# Patient Record
Sex: MALE | Race: WHITE | NOT HISPANIC OR LATINO | Employment: FULL TIME | URBAN - METROPOLITAN AREA
[De-identification: names, ages, dates, MRNs, and addresses within clinical notes are randomized per-mention and may not be internally consistent; named-entity substitution may affect disease eponyms.]

---

## 2017-03-17 PROBLEM — S22.41XD CLOSED FRACTURE OF MULTIPLE RIBS OF RIGHT SIDE WITH ROUTINE HEALING: Status: ACTIVE | Noted: 2017-03-17

## 2017-03-17 PROBLEM — S27.2XXA HEMOTHORAX WITH PNEUMOTHORAX, TRAUMATIC: Status: ACTIVE | Noted: 2017-03-17

## 2017-03-17 PROBLEM — S42.001A CLOSED DISPLACED FRACTURE OF RIGHT CLAVICLE: Status: ACTIVE | Noted: 2017-03-17

## 2017-03-17 PROBLEM — S42.021A CLOSED DISPLACED FRACTURE OF SHAFT OF RIGHT CLAVICLE: Status: ACTIVE | Noted: 2017-03-17

## 2017-05-11 ENCOUNTER — TELEPHONE (OUTPATIENT)
Dept: VASCULAR SURGERY | Facility: CLINIC | Age: 57
End: 2017-05-11

## 2017-05-11 NOTE — TELEPHONE ENCOUNTER
----- Message from Mary Murphy sent at 5/11/2017 10:43 AM CDT -----  Contact: self  Patient wants to speak with a nurse regarding scheduling an appointment. Please call back at [phone] (home)

## 2017-05-11 NOTE — TELEPHONE ENCOUNTER
Offered appointment for same day but pt declined stated he needed to go to work.  Scheduled for 5/17/17 in [place].  Pt confirmed appointment.

## 2017-05-18 ENCOUNTER — INITIAL CONSULT (OUTPATIENT)
Dept: VASCULAR SURGERY | Facility: CLINIC | Age: 57
End: 2017-05-18
Payer: COMMERCIAL

## 2017-05-18 VITALS — HEIGHT: 69 IN | BODY MASS INDEX: 26.22 KG/M2 | WEIGHT: 177 LBS | RESPIRATION RATE: 16 BRPM

## 2017-05-18 DIAGNOSIS — I71.22 AORTIC ARCH ANEURYSM: Primary | ICD-10-CM

## 2017-05-18 PROCEDURE — 99205 OFFICE O/P NEW HI 60 MIN: CPT | Mod: S$GLB,,, | Performed by: THORACIC SURGERY (CARDIOTHORACIC VASCULAR SURGERY)

## 2017-05-18 PROCEDURE — 99999 PR PBB SHADOW E&M-EST. PATIENT-LVL II: CPT | Mod: PBBFAC,,, | Performed by: THORACIC SURGERY (CARDIOTHORACIC VASCULAR SURGERY)

## 2017-05-18 PROCEDURE — 1160F RVW MEDS BY RX/DR IN RCRD: CPT | Mod: S$GLB,,, | Performed by: THORACIC SURGERY (CARDIOTHORACIC VASCULAR SURGERY)
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
## 2017-05-19 DIAGNOSIS — I71.20 THORACIC AORTIC ANEURYSM WITHOUT RUPTURE: Primary | ICD-10-CM

## 2017-05-22 ENCOUNTER — HOSPITAL ENCOUNTER (OUTPATIENT)
Dept: RADIOLOGY | Facility: HOSPITAL | Age: 57
Discharge: HOME | End: 2017-05-22
Attending: THORACIC SURGERY (CARDIOTHORACIC VASCULAR SURGERY)
Payer: COMMERCIAL

## 2017-05-22 DIAGNOSIS — I71.20 THORACIC AORTIC ANEURYSM WITHOUT RUPTURE: ICD-10-CM

## 2017-05-22 PROCEDURE — 74174 CTA ABD&PLVS W/CONTRAST: CPT | Mod: 26,,, | Performed by: RADIOLOGY

## 2017-05-22 PROCEDURE — 71275 CT ANGIOGRAPHY CHEST: CPT | Mod: 26,,, | Performed by: RADIOLOGY

## 2017-05-22 PROCEDURE — 25500020 PHARM REV CODE 255: Mod: PO | Performed by: THORACIC SURGERY (CARDIOTHORACIC VASCULAR SURGERY)

## 2017-05-22 PROCEDURE — 74174 CTA ABD&PLVS W/CONTRAST: CPT | Mod: TC,PO

## 2017-05-22 RX ADMIN — IOHEXOL 120 ML: 350 INJECTION, SOLUTION INTRAVENOUS at 02:05

## 2017-06-01 DIAGNOSIS — I71.20 THORACIC AORTIC ANEURYSM WITHOUT RUPTURE: Primary | ICD-10-CM

## 2017-06-12 PROBLEM — I71.20 THORACIC AORTIC ANEURYSM WITHOUT RUPTURE: Status: ACTIVE | Noted: 2017-06-12

## 2017-06-14 PROBLEM — G82.20 SPINAL CORD ISCHEMIA CAUSING LOWER EXTREMITY PARAPARESIS: Status: ACTIVE | Noted: 2017-06-14

## 2017-06-14 PROBLEM — G95.11 SPINAL CORD ISCHEMIA CAUSING LOWER EXTREMITY PARAPARESIS: Status: ACTIVE | Noted: 2017-06-14

## 2017-07-03 ENCOUNTER — TELEPHONE (OUTPATIENT)
Dept: VASCULAR SURGERY | Facility: CLINIC | Age: 57
End: 2017-07-03

## 2017-07-03 NOTE — TELEPHONE ENCOUNTER
----- Message from Cami Mcpherson sent at 7/3/2017  8:53 AM CDT -----  Contact: pj godinez  pt has been accepted to rehab, orders needed...[phone]

## 2017-07-19 PROBLEM — A41.9 SEPSIS: Status: ACTIVE | Noted: 2017-07-19

## 2017-07-20 PROBLEM — R50.9 FEVER: Status: ACTIVE | Noted: 2017-07-20

## 2017-07-20 PROBLEM — N30.00 ACUTE CYSTITIS WITHOUT HEMATURIA: Status: ACTIVE | Noted: 2017-07-20

## 2017-07-21 PROBLEM — N39.0 UTI DUE TO KLEBSIELLA SPECIES: Status: ACTIVE | Noted: 2017-07-20

## 2017-07-21 PROBLEM — N31.9 NEUROGENIC BLADDER: Status: ACTIVE | Noted: 2017-07-21

## 2017-07-21 PROBLEM — B96.89 UTI DUE TO KLEBSIELLA SPECIES: Status: ACTIVE | Noted: 2017-07-20

## 2017-07-22 PROBLEM — R06.02 SHORTNESS OF BREATH: Status: ACTIVE | Noted: 2017-07-22

## 2017-07-29 PROBLEM — R50.9 FEVER: Status: RESOLVED | Noted: 2017-07-20 | Resolved: 2017-07-29

## 2017-09-01 PROBLEM — Z86.79 H/O THORACIC AORTIC ANEURYSM REPAIR: Status: ACTIVE | Noted: 2017-09-01

## 2017-09-01 PROBLEM — R49.0 DYSPHONIA: Status: ACTIVE | Noted: 2017-09-01

## 2017-09-01 PROBLEM — Z98.890 H/O THORACIC AORTIC ANEURYSM REPAIR: Status: ACTIVE | Noted: 2017-09-01

## 2017-09-21 PROBLEM — R15.9 FULL INCONTINENCE OF FECES: Status: ACTIVE | Noted: 2017-09-21

## 2017-10-04 PROBLEM — L89.312 DECUBITUS ULCER OF RIGHT BUTTOCK, STAGE 2: Status: ACTIVE | Noted: 2017-10-04

## 2017-10-04 PROBLEM — L89.152 PRESSURE ULCER OF SACRAL REGION, STAGE 2: Status: ACTIVE | Noted: 2017-10-04

## 2017-10-04 PROBLEM — L89.322 DECUBITUS ULCER OF LEFT BUTTOCK, STAGE 2: Status: ACTIVE | Noted: 2017-10-04

## 2017-10-04 PROBLEM — L89.620 PRESSURE ULCER OF LEFT HEEL, UNSTAGEABLE: Status: ACTIVE | Noted: 2017-10-04

## 2017-10-12 PROBLEM — R15.9 INCONTINENCE OF FECES: Status: ACTIVE | Noted: 2017-10-12

## 2017-10-13 PROBLEM — D62 ACUTE BLOOD LOSS ANEMIA: Status: ACTIVE | Noted: 2017-10-13

## 2017-10-23 PROBLEM — J38.01: Status: ACTIVE | Noted: 2017-10-23

## 2017-10-25 PROBLEM — L02.31 ABSCESS OF RIGHT BUTTOCK: Status: ACTIVE | Noted: 2017-10-25

## 2018-01-09 PROBLEM — M54.9 BACK PAIN: Status: ACTIVE | Noted: 2018-01-09

## 2018-01-09 PROBLEM — F32.A DEPRESSION: Status: ACTIVE | Noted: 2018-01-09

## 2018-01-10 PROBLEM — S31.819A OPEN WOUND OF RIGHT BUTTOCK WITH COMPLICATION: Status: ACTIVE | Noted: 2018-01-10

## 2018-01-10 PROBLEM — L02.415 ABSCESS OF RIGHT HIP: Status: ACTIVE | Noted: 2018-01-10

## 2018-01-10 PROBLEM — L89.320 DECUBITUS ULCER OF LEFT BUTTOCK, UNSTAGEABLE: Status: ACTIVE | Noted: 2018-01-10

## 2018-01-10 PROBLEM — L89.213: Status: ACTIVE | Noted: 2018-01-10

## 2018-01-10 PROBLEM — L89.613 PRESSURE ULCER OF RIGHT HEEL, STAGE 3: Status: ACTIVE | Noted: 2017-10-04

## 2018-04-18 PROBLEM — L89.159 DECUBITUS ULCER OF SACRAL REGION: Status: ACTIVE | Noted: 2018-04-18

## 2018-04-20 PROBLEM — R79.89 BLOOD CULTURE POSITIVE FOR MICROORGANISM: Status: ACTIVE | Noted: 2018-04-20

## 2018-04-23 PROBLEM — A49.01 STAPHYLOCOCCUS AUREUS INFECTION: Status: ACTIVE | Noted: 2018-04-23

## 2018-04-23 PROBLEM — M46.28 SACRAL OSTEOMYELITIS: Status: ACTIVE | Noted: 2018-04-23

## 2018-04-23 PROBLEM — A49.8 PSEUDOMONAS AERUGINOSA INFECTION: Status: ACTIVE | Noted: 2018-04-23

## 2018-04-23 PROBLEM — A49.1 ENTEROCOCCAL INFECTION: Status: ACTIVE | Noted: 2018-04-23

## 2018-04-23 PROBLEM — B96.89 BACTEREMIA DUE TO OTHER BACTERIA: Status: ACTIVE | Noted: 2018-04-20

## 2018-04-23 PROBLEM — R78.81 BACTEREMIA DUE TO OTHER BACTERIA: Status: ACTIVE | Noted: 2018-04-20

## 2018-04-23 PROBLEM — A49.1 INFECTION DUE TO PEPTOSTREPTOCOCCUS SPECIES: Status: ACTIVE | Noted: 2018-04-23

## 2018-04-25 PROBLEM — A49.02 MRSA INFECTION: Status: ACTIVE | Noted: 2018-04-23

## 2018-07-03 PROBLEM — L89.220: Status: ACTIVE | Noted: 2018-01-10

## 2018-09-20 PROBLEM — L89.894: Status: ACTIVE | Noted: 2018-09-20

## 2018-09-20 PROBLEM — L89.614 STAGE IV PRESSURE ULCER OF RIGHT HEEL: Status: ACTIVE | Noted: 2018-09-20

## 2018-10-08 PROBLEM — J38.3 VOCAL CORD DYSFUNCTION: Status: ACTIVE | Noted: 2017-10-23

## 2019-01-11 ENCOUNTER — TELEPHONE (OUTPATIENT)
Dept: PODIATRY | Facility: CLINIC | Age: 59
End: 2019-01-11

## 2019-01-11 NOTE — TELEPHONE ENCOUNTER
Called patient to see about bernadine'd an appt to see Dr. Borjas for ingrown toenail. No answer left voicemail.

## 2019-01-16 ENCOUNTER — TELEPHONE (OUTPATIENT)
Dept: PODIATRY | Facility: CLINIC | Age: 59
End: 2019-01-16

## 2019-01-16 NOTE — TELEPHONE ENCOUNTER
----- Message from Rimma Ramsay sent at 1/16/2019  2:45 PM CST -----  Contact: Patient  Type:  Patient Returning Call    Who Called:  Patient  Who Left Message for Patient:  Alena  Does the patient know what this is regarding?:  An appt  Best Call Back Number:    Additional Information:  Call to pod. No answer. He is coming for his right big toe. Please advise.

## 2019-01-16 NOTE — TELEPHONE ENCOUNTER
Spoke with patient he really did not need anything. He already made an appt for 1/28/19 for an ingrown toenail. No issues or questions. Patient voiced all understanding.

## 2019-01-16 NOTE — TELEPHONE ENCOUNTER
Received IM from phone room about this patient calling was in a room assisting another patient. Called him back no answer. Left voicemail.    Did speak with patient on Friday 1/11/19, patient stated that he did not feel like he needed an appt for an ingrown toenail at this time. Patient voiced all understanding.

## 2019-01-28 ENCOUNTER — OFFICE VISIT (OUTPATIENT)
Dept: PODIATRY | Facility: CLINIC | Age: 59
End: 2019-01-28
Payer: COMMERCIAL

## 2019-01-28 VITALS — HEIGHT: 69 IN | WEIGHT: 140 LBS | RESPIRATION RATE: 20 BRPM | BODY MASS INDEX: 20.73 KG/M2

## 2019-01-28 DIAGNOSIS — L60.0 INGROWN NAIL: Primary | ICD-10-CM

## 2019-01-28 PROCEDURE — 99203 OFFICE O/P NEW LOW 30 MIN: CPT | Mod: S$GLB,,, | Performed by: PODIATRIST

## 2019-01-28 PROCEDURE — 3077F SYST BP >= 140 MM HG: CPT | Mod: CPTII,S$GLB,, | Performed by: PODIATRIST

## 2019-01-28 PROCEDURE — 99999 PR PBB SHADOW E&M-EST. PATIENT-LVL IV: CPT | Mod: PBBFAC,,, | Performed by: PODIATRIST

## 2019-01-28 PROCEDURE — 99203 PR OFFICE/OUTPT VISIT, NEW, LEVL III, 30-44 MIN: ICD-10-PCS | Mod: S$GLB,,, | Performed by: PODIATRIST

## 2019-01-28 PROCEDURE — 87070 CULTURE OTHR SPECIMN AEROBIC: CPT

## 2019-01-28 PROCEDURE — 3077F PR MOST RECENT SYSTOLIC BLOOD PRESSURE >= 140 MM HG: ICD-10-PCS | Mod: CPTII,S$GLB,, | Performed by: PODIATRIST

## 2019-01-28 PROCEDURE — 3008F BODY MASS INDEX DOCD: CPT | Mod: CPTII,S$GLB,, | Performed by: PODIATRIST

## 2019-01-28 PROCEDURE — 87106 FUNGI IDENTIFICATION YEAST: CPT

## 2019-01-28 PROCEDURE — 3008F PR BODY MASS INDEX (BMI) DOCUMENTED: ICD-10-PCS | Mod: CPTII,S$GLB,, | Performed by: PODIATRIST

## 2019-01-28 PROCEDURE — 87075 CULTR BACTERIA EXCEPT BLOOD: CPT

## 2019-01-28 PROCEDURE — 3078F DIAST BP <80 MM HG: CPT | Mod: CPTII,S$GLB,, | Performed by: PODIATRIST

## 2019-01-28 PROCEDURE — 3078F PR MOST RECENT DIASTOLIC BLOOD PRESSURE < 80 MM HG: ICD-10-PCS | Mod: CPTII,S$GLB,, | Performed by: PODIATRIST

## 2019-01-28 PROCEDURE — 99999 PR PBB SHADOW E&M-EST. PATIENT-LVL IV: ICD-10-PCS | Mod: PBBFAC,,, | Performed by: PODIATRIST
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
## 2019-01-31 LAB — BACTERIA SPEC AEROBE CULT: NORMAL

## 2019-02-01 ENCOUNTER — TELEPHONE (OUTPATIENT)
Dept: PODIATRY | Facility: CLINIC | Age: 59
End: 2019-02-01

## 2019-02-01 DIAGNOSIS — B36.9 FUNGAL SKIN INFECTION: Primary | ICD-10-CM

## 2019-02-01 RX ORDER — FLUCONAZOLE 150 MG/1
150 TABLET ORAL DAILY
Qty: 1 TABLET | Refills: 0 | Status: SHIPPED | OUTPATIENT
Start: 2019-02-01 | End: 2019-02-02

## 2019-02-01 NOTE — TELEPHONE ENCOUNTER
Left message to call back for results and Rx was sent to the Pharmacy.        ----- Message from Nahun Borjas DPM sent at 2/1/2019 12:49 PM CST -----  Hey Ladies,    The patient had a fungal infection of the skin.  I have sent one tablet of diflucan to his pharmacy.  Thanks!    Dr. Borjas

## 2019-02-04 LAB — BACTERIA SPEC ANAEROBE CULT: NORMAL

## 2019-02-12 ENCOUNTER — TELEPHONE (OUTPATIENT)
Dept: PODIATRY | Facility: CLINIC | Age: 59
End: 2019-02-12

## 2019-02-12 NOTE — TELEPHONE ENCOUNTER
Spoke with Susu in [employer] at [phone].     She was needing info/ help seeing if patient was approved for his US that he has tomorrow. I asked her if she called the Pre[facility]Service dept b/c we do not do the auth's here in office. She did not know who to contact so I told her I would send this message to someone in the dept to see if they could help. She ask if someone could call her back.      Krissy Welsh are you able to help with this? Thank you.   If not then who can?

## 2019-02-12 NOTE — TELEPHONE ENCOUNTER
----- Message from Susu Ibrahim sent at 2/12/2019  8:37 AM CST -----  Contact: Susu [employer] Ext [phone] or [phone]  Good morning, Patient is on the schedule for an Ultrasound tomorrow with cpt 95107 that required auth through [employer]. Can someone call me with the authorization information for this test. Thank you.

## 2019-02-25 ENCOUNTER — OFFICE VISIT (OUTPATIENT)
Dept: PODIATRY | Facility: CLINIC | Age: 59
End: 2019-02-25
Payer: COMMERCIAL

## 2019-02-25 VITALS — BODY MASS INDEX: 20.73 KG/M2 | WEIGHT: 140 LBS | HEIGHT: 69 IN

## 2019-02-25 DIAGNOSIS — L60.0 INGROWN TOENAIL: Primary | ICD-10-CM

## 2019-02-25 DIAGNOSIS — L03.032 PARONYCHIA OF TOE OF LEFT FOOT: ICD-10-CM

## 2019-02-25 PROCEDURE — 87075 CULTR BACTERIA EXCEPT BLOOD: CPT

## 2019-02-25 PROCEDURE — 99499 UNLISTED E&M SERVICE: CPT | Mod: S$GLB,,, | Performed by: PODIATRIST

## 2019-02-25 PROCEDURE — 87186 SC STD MICRODIL/AGAR DIL: CPT

## 2019-02-25 PROCEDURE — 87077 CULTURE AEROBIC IDENTIFY: CPT

## 2019-02-25 PROCEDURE — 99999 PR PBB SHADOW E&M-EST. PATIENT-LVL III: CPT | Mod: PBBFAC,,, | Performed by: PODIATRIST

## 2019-02-25 PROCEDURE — 99999 PR PBB SHADOW E&M-EST. PATIENT-LVL III: ICD-10-PCS | Mod: PBBFAC,,, | Performed by: PODIATRIST

## 2019-02-25 PROCEDURE — 99499 NO LOS: ICD-10-PCS | Mod: S$GLB,,, | Performed by: PODIATRIST

## 2019-02-25 PROCEDURE — 87070 CULTURE OTHR SPECIMN AEROBIC: CPT

## 2019-02-25 PROCEDURE — 11730 AVULSION NAIL PLATE SIMPLE 1: CPT | Mod: T5,S$GLB,, | Performed by: PODIATRIST

## 2019-02-25 PROCEDURE — 11730 NAIL REMOVAL: ICD-10-PCS | Mod: T5,S$GLB,, | Performed by: PODIATRIST

## 2019-02-25 RX ORDER — TOBRAMYCIN 3 MG/ML
SOLUTION/ DROPS OPHTHALMIC
Qty: 5 ML | Refills: 0 | Status: SHIPPED | OUTPATIENT
Start: 2019-02-25 | End: 2019-03-17

## 2019-02-25 NOTE — PATIENT INSTRUCTIONS
POST NAIL PROCEDURE INSTRUCTIONS    1. Leave bandage intact for 12-24 hours. If the bandage sticks as you try to remove it, soak it in warm water until it lifts off.    2. Wash the toe with antibacterial soap and water separate from the body.    3. Dry foot completely after washing, and apply two tobrex drops and a fabric or cloth bandaid.  Wear open-toed shoes as needed for comfort.     4. Take Advil or Tylenol as needed for pain.     5. Your toe may drain for the next few days. Normal drainage is yellow-to-pink, and clear, much like the fluid in a blister. Watch for redness spreading up your toe into your foot, white thick drainage (pus), pain unrelieved by medication, or nausea/vomiting/fever/chills. These are signs of infection. Please call the clinic or visit your doctor.    \
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
## 2019-02-26 NOTE — PROCEDURES
Nail Removal  Date/Time: 2/25/2019 6:17 PM  Performed by: Nahun Borjas DPM  Authorized by: Nahun Borjas DPM     Consent Done?:  Yes (Written)    Location:  Right foot  Location detail:  Right big toe  Anesthesia:  Digital block  Local anesthetic: lidocaine 1% without epinephrine  Anesthetic total (ml):  3  Preparation:  Skin prepped with alcohol and skin prepped with Betadine    Amount removed:  Partial  Side:  Ulnar  Wedge excision of skin of nail fold: No    Nail bed sutured?: No    Nail matrix removed:  None  Removed nail replaced and anchored: No    Dressing applied:  4x4, antibiotic ointment and dressing applied  Patient tolerance:  Patient tolerated the procedure well with no immediate complications     Applied three 30 second application of phenol.

## 2019-02-27 ENCOUNTER — TELEPHONE (OUTPATIENT)
Dept: PODIATRY | Facility: CLINIC | Age: 59
End: 2019-02-27

## 2019-02-27 NOTE — TELEPHONE ENCOUNTER
Spoke with Maria T, she wanted to let us know that they did not get the drops for the patients toes yet. I called the pharmacy and they stated that did not have it in stock but they did order it and it came in today. Let Maria T know that it was in and they could pick it up. Maria T voiced all understanding.

## 2019-02-27 NOTE — TELEPHONE ENCOUNTER
----- Message from Chaka Sorenson sent at 2/27/2019 11:14 AM CST -----  Contact: Maria T/[facility]  Maria T stated patient had toenail removed yesterday 2/26/19.  [facility] is telling Maria T & patient that they never received the Rx for tobramycin sulfate 0.3% (TOBREX) 0.3 % ophthalmic solution that was sent over on 2/25/19.      [facility]/pharmacy #[id number] - [place] - [address] [place] [address] [facility] [place] [address]  Phone: [phone] Fax: [phone]    Maria T's call back number is [phone]

## 2019-02-28 DIAGNOSIS — L03.032 PARONYCHIA OF TOE OF LEFT FOOT: Primary | ICD-10-CM

## 2019-02-28 LAB — BACTERIA SPEC AEROBE CULT: NORMAL

## 2019-02-28 RX ORDER — CLINDAMYCIN HYDROCHLORIDE 300 MG/1
300 CAPSULE ORAL EVERY 8 HOURS
Qty: 21 CAPSULE | Refills: 0 | Status: SHIPPED | OUTPATIENT
Start: 2019-02-28 | End: 2019-03-17

## 2019-03-04 ENCOUNTER — OFFICE VISIT (OUTPATIENT)
Dept: PODIATRY | Facility: CLINIC | Age: 59
End: 2019-03-04
Payer: COMMERCIAL

## 2019-03-04 VITALS
HEART RATE: 59 BPM | BODY MASS INDEX: 20.73 KG/M2 | WEIGHT: 140 LBS | DIASTOLIC BLOOD PRESSURE: 87 MMHG | SYSTOLIC BLOOD PRESSURE: 157 MMHG | HEIGHT: 69 IN

## 2019-03-04 DIAGNOSIS — Z98.890 STATUS POST NAIL SURGERY: Primary | ICD-10-CM

## 2019-03-04 LAB — BACTERIA SPEC ANAEROBE CULT: NORMAL

## 2019-03-04 PROCEDURE — 99999 PR PBB SHADOW E&M-EST. PATIENT-LVL III: ICD-10-PCS | Mod: PBBFAC,,, | Performed by: PODIATRIST

## 2019-03-04 PROCEDURE — 99024 PR POST-OP FOLLOW-UP VISIT: ICD-10-PCS | Mod: S$GLB,,, | Performed by: PODIATRIST

## 2019-03-04 PROCEDURE — 99024 POSTOP FOLLOW-UP VISIT: CPT | Mod: S$GLB,,, | Performed by: PODIATRIST

## 2019-03-04 PROCEDURE — 99999 PR PBB SHADOW E&M-EST. PATIENT-LVL III: CPT | Mod: PBBFAC,,, | Performed by: PODIATRIST
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
## 2019-04-22 PROBLEM — R78.81 BACTEREMIA DUE TO OTHER BACTERIA: Status: RESOLVED | Noted: 2018-04-20 | Resolved: 2019-04-22

## 2019-04-22 PROBLEM — A49.1 ENTEROCOCCAL INFECTION: Status: RESOLVED | Noted: 2018-04-23 | Resolved: 2019-04-22

## 2019-04-22 PROBLEM — F32.A DEPRESSION: Status: RESOLVED | Noted: 2018-01-09 | Resolved: 2019-04-22

## 2019-04-22 PROBLEM — M46.28 SACRAL OSTEOMYELITIS: Status: RESOLVED | Noted: 2018-04-23 | Resolved: 2019-04-22

## 2019-04-22 PROBLEM — B96.89 BACTEREMIA DUE TO OTHER BACTERIA: Status: RESOLVED | Noted: 2018-04-20 | Resolved: 2019-04-22

## 2019-05-31 PROBLEM — R06.02 SHORTNESS OF BREATH: Status: RESOLVED | Noted: 2017-07-22 | Resolved: 2019-05-31

## 2019-05-31 PROBLEM — L89.613 PRESSURE ULCER OF RIGHT HEEL, STAGE 3: Status: RESOLVED | Noted: 2017-10-04 | Resolved: 2019-05-31

## 2019-05-31 PROBLEM — L89.894: Status: RESOLVED | Noted: 2018-09-20 | Resolved: 2019-05-31

## 2019-09-03 ENCOUNTER — CLINICAL SUPPORT (OUTPATIENT)
Dept: REHABILITATION | Facility: HOSPITAL | Age: 59
End: 2019-09-03
Payer: COMMERCIAL

## 2019-09-03 DIAGNOSIS — G82.20 SPINAL CORD ISCHEMIA CAUSING LOWER EXTREMITY PARAPARESIS: Primary | ICD-10-CM

## 2019-09-03 DIAGNOSIS — G95.11 SPINAL CORD ISCHEMIA CAUSING LOWER EXTREMITY PARAPARESIS: Primary | ICD-10-CM

## 2019-09-03 NOTE — PROGRESS NOTES
Occupational Therapy   Driving Evaluation / Hand control training / Discharge Summary   Fabricio Gupta   MRN: [id number]     Referring Physician: Cam Kc MD  Diagnosis: Paraplegia T-9   History: Pt is currently NOT a licensed  but has been referred to Occupational Therapy by his MD for clinical and on-road testing to be used for driving evaluation and to determine if he is a candidate for driving with hand controls.  Mr Gupta has been a paraplegic since 2017 and has let he 's license lapse as he was concentrating on becoming Independent in his home with self care and functional mobility. He is now doing well and wants to pursue driving again but realizes that he will need an evaluation and training to determine if he can.    SUBJECTIVE: Pt stated that he feels that it is time for him to get back to driving to improve his quality of life.     OBJECTIVE:   Physical Function Testing:    ROM:  WFLs B UEs    Head / Neck ROM: WNL   Pt is Right hand dominant   Strength: WFL B UEs   Due to his paraplegia, he has no movement in his B LE's  Balance:    Static and Dynamic sitting- Normal   Static Standing - N/A    Transfers and Mobility: Modified Independent for slide board transfers    Perceptual testing:    Letter cancellation:  34/34 passing score where testing was completed in 38 seconds, average time for completion is 1 minute 15 seconds   Line bisection:  WNLs  Maze Test - 18 seconds, 0 error  ( Cut point score is 60 seconds or less with 1 error or less)   Trail Making Test A - 28  seconds   (Average 29 seconds, Deficient > 78 seconds)    Trail Making Test B -  52  seconds  (Average 75 seconds, Deficient > 273 seconds)   Motor Free Visual Perception Test (MVPT) which assesses figure ground, visual  closure and visual memory, 34/36 , a passing score, where testing was competed in 6 minutes 12 seconds, average time for completion is 7 minutes    Right/Left discrimination: 4/4     Auditory  discrimination: WFL     Vision testing:     Glasses were worn during testing  Quick acuity and night vision: pass  Color vision: 4/4,   Pt correctly identified 11/12 road signs and was 4/4 for depth perception  Both eye acuity: 20/25  Right eye acuity: 20/30  Left eye acuity: 20/25  Phoria which assesses binocular vision was normal.  Horizontal field test and nasal vision: Normal    Visual acuity minimum standards for the [facility] is 20/40 in one eye.    Cognitive testing:   Short term memory:  4/4,    Immediate # recall (Digit span) : 9, high passing score    Cognitive sequencing of months of year in reverse:  No error, no delay   Long term memory:  WNL   Judgment questions regarding rules of the road: 8/8    [facility] Mental Status Exam (UMS) which is used to  detect mild neurocognitive disorder and dementia: Pt scored 28/30 which  scores in the Normal range..    Insight:  Very good as Pt understanding the need for testing and training.    Communication:   Expressive aphasia:  Not found  Receptive aphasia:  Not found    In car, on-road assessment:  Pt transferred to car Modified Independently with use of a sliding board and mildly increased time and was able to adjust mirrors and seat and anish seat belt. Pt was first oriented to Push- right hand controls and tested their use in a vacant parking lot where he demonstrated understanding and functional use. The same procedure was performed with Push-rock hand controls. Of the two, he  liked the use of Push- rock controls the best and this was the type of choice for continued training. After basic training for use of Push- rock controls in the vacant parking lot, he reported that he was ready to continue practice on actual road conditions and traffic.   Pt safely pulled car out of parking lot and drove on side streets per instruction successfully making turns and stopping smoothly at stop signs. After about 30 minutes of driving in a neighborhood, Pt  was ready to drive on busier streets and then on to a divided highway where he correctly obeyed traffic signs and signals, speed limits, followed at appropriate distances, changed lanes appropriately when asked and had no  difficulty stopping vehicle at appropriate distances. Mr Gupta drove at slightly lower than normal speeds but kept up with traffic flow and was understanding to stay in the middle or right lanes in order to let faster traffic pass. The roads then led to the entrance of the [place] where he felt comfortable and had no difficulty driving at higher speeds as he agreed that Interstate driving was easier than driving on local roads with traffic. Over 30 minutes was spent driving on Interstate highways. Mr Gupta was directed to exit and take [place] back to hospital grounds where he  entered the parking lot and was able to park the vehicle in a parking space with no difficulty. The 2 hours that were spent in training this day showed that Mr Gupta is proficient with use of Push - Rock hand controls for driving conditions. The next steps of obtaining an Rx from his MD and then getting the info the vendor of his choice was discussed. He expressed understanding of all.      ASSESSMENT:   Pt demonstrated the ability to operate an automobile with Push Rock hand controls for his Left hand. He is ready to have them installed in his vehicle for use. He was given instructions to choose a vendor and understood all of the steps to having the controls installed in his vehicle.  Findings were notified to the office of Cam Kc MD. Changes in Pt medical status may warrant retesting in the future as the conclusion reached and the recommendations made reflect findings at the time of this evaluation.     Pt has met the following goal:      Pt will be Independent with the use of Push- rock hand controls for driving.     PLAN:   Discharge patient from outpatient Occupational Therapy services as Pt  and MD needs being met with completion and reporting of this evaluation. Vendor for hand controls will be contacted and information given for installation in Pt's vehicle.        JAIRO Leigh, S  Occupational Therapist, Certified  Rehabilitation Specialist  9/3/2019

## 2020-01-06 PROBLEM — Z93.3 COLOSTOMY STATUS: Status: ACTIVE | Noted: 2020-01-06

## 2020-01-06 PROBLEM — I74.3 EMBOLISM AND THROMBOSIS OF ARTERIES OF THE LOWER EXTREMITIES: Status: ACTIVE | Noted: 2020-01-06

## 2021-03-18 PROBLEM — G82.20 PARAPLEGIA: Status: ACTIVE | Noted: 2021-03-18

## 2021-03-18 PROBLEM — I35.8 OTHER NONRHEUMATIC AORTIC VALVE DISORDERS: Status: ACTIVE | Noted: 2021-03-18

## 2021-11-02 ENCOUNTER — TELEPHONE (OUTPATIENT)
Dept: FAMILY MEDICINE | Facility: CLINIC | Age: 61
End: 2021-11-02
Payer: MEDICARE

## 2022-10-19 DIAGNOSIS — G82.20 PARAPLEGIA: Primary | ICD-10-CM

## 2022-11-10 ENCOUNTER — CLINICAL SUPPORT (OUTPATIENT)
Dept: REHABILITATION | Facility: HOSPITAL | Age: 62
End: 2022-11-10
Payer: MEDICARE

## 2022-11-10 DIAGNOSIS — G82.20 PARAPLEGIA: Primary | ICD-10-CM

## 2022-11-10 DIAGNOSIS — Z74.09 IMPAIRED FUNCTIONAL MOBILITY, BALANCE, GAIT, AND ENDURANCE: ICD-10-CM

## 2022-11-10 PROCEDURE — 97162 PT EVAL MOD COMPLEX 30 MIN: CPT | Mod: PN

## 2022-11-11 PROBLEM — Z74.09 IMPAIRED FUNCTIONAL MOBILITY, BALANCE, GAIT, AND ENDURANCE: Status: ACTIVE | Noted: 2022-11-11

## 2022-11-15 NOTE — PLAN OF CARE
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 strength, , transfer ability and trunk stability to safely operate a POV (scooter). No  The additional benefits of a power wheelchair will more effectively enable MRADL's in home. No   The patient demonstrates ability/potential ability to use recommended equipment. Yes  The patient is willing and motivated to use the recommended equipment. Yes      Pt prognosis is Good.   Pt will benefit from skilled outpatient Physical Therapy to address the deficits stated above and in the chart below, provide pt/family education, and to maximize pt's level of independence.     Plan of care discussed with patient: Yes  Pt's spiritual, cultural and educational needs considered and patient is agreeable to the plan of care and goals as stated below:     Anticipated Barriers for therapy: none    PT Medical Necessity is demonstrated by the following:    History  Co-morbidities and personal factors that may impact the plan of care Co-morbidities:   advanced age, history of cancer, and HTN    Personal Factors:   no deficits     moderate   Examination  Body Structures and Functions, activity limitations and participation restrictions that may impact the plan of  care Body Regions:   neck  back  lower extremities    Body Systems:    ROM  strength  gross coordinated movement  balance  gait  transfers  transitions  motor control    Participation Restrictions:  Unable to ambulate    Activity limitations:   Learning and applying knowledge  no deficits    General Tasks and Commands  no deficits    Communication  no deficits    Mobility  walking  driving (bike, car, motorcycle)    Self care  toileting  dressing    Domestic Life  shopping  doing house work (cleaning house, washing dishes, laundry)  assisting others    Interactions/Relationships  family relationships    Life Areas  employment    Community and Social Life  community life  recreation and leisure         moderate   Clinical Presentation evolving clinical presentation with changing clinical characteristics moderate   Decision Making/ Complexity Score: moderate         Goals not indicated at this time.    Plan   Plan of care Certification: 11/10/2022 to 11/10/2022.    Alexandrmarcelle Pisano, PT  11/10/2022  License Number: [id number]   Therapist contact phone number: [phone]   As the evaluating therapist, I hereby attest that I have personally completed this evaluation and that I am not an employee of or working under contract to the (s) or the provider(s) of the durable medical equipment recommended in my evaluation. I further attest that I have not and will not receive remuneration of any kind from the (s) or the durable medical equipment provider(s) for the equipment I have recommended with this evaluation.     The following ATP was present and participated in this evaluation:   Juan Mcallister, ATP from [employer].   Vendor phone: [phone]        I concur with the above findings and recommendations of the therapist:      Physician: Cam Kc MD        Physician signature:___________________________________   date: ___________